# Patient Record
Sex: MALE | Race: OTHER | HISPANIC OR LATINO | URBAN - METROPOLITAN AREA
[De-identification: names, ages, dates, MRNs, and addresses within clinical notes are randomized per-mention and may not be internally consistent; named-entity substitution may affect disease eponyms.]

---

## 2018-01-05 ENCOUNTER — EMERGENCY (EMERGENCY)
Facility: HOSPITAL | Age: 53
LOS: 1 days | Discharge: HOME | End: 2018-01-05
Attending: EMERGENCY MEDICINE | Admitting: EMERGENCY MEDICINE
Payer: SELF-PAY

## 2018-01-05 VITALS
TEMPERATURE: 99 F | RESPIRATION RATE: 18 BRPM | HEART RATE: 75 BPM | OXYGEN SATURATION: 100 % | DIASTOLIC BLOOD PRESSURE: 79 MMHG | SYSTOLIC BLOOD PRESSURE: 130 MMHG

## 2018-01-05 LAB
ALBUMIN SERPL ELPH-MCNC: 4.3 G/DL — SIGNIFICANT CHANGE UP (ref 3.3–5)
ALP SERPL-CCNC: 52 U/L — SIGNIFICANT CHANGE UP (ref 40–120)
ALT FLD-CCNC: 38 U/L RC — SIGNIFICANT CHANGE UP (ref 10–45)
ANION GAP SERPL CALC-SCNC: 12 MMOL/L — SIGNIFICANT CHANGE UP (ref 5–17)
AST SERPL-CCNC: 25 U/L — SIGNIFICANT CHANGE UP (ref 10–40)
BASOPHILS # BLD AUTO: 0.1 K/UL — SIGNIFICANT CHANGE UP (ref 0–0.2)
BASOPHILS NFR BLD AUTO: 0.8 % — SIGNIFICANT CHANGE UP (ref 0–2)
BILIRUB SERPL-MCNC: 0.6 MG/DL — SIGNIFICANT CHANGE UP (ref 0.2–1.2)
BUN SERPL-MCNC: 12 MG/DL — SIGNIFICANT CHANGE UP (ref 7–23)
CALCIUM SERPL-MCNC: 8.8 MG/DL — SIGNIFICANT CHANGE UP (ref 8.4–10.5)
CHLORIDE SERPL-SCNC: 99 MMOL/L — SIGNIFICANT CHANGE UP (ref 96–108)
CO2 SERPL-SCNC: 26 MMOL/L — SIGNIFICANT CHANGE UP (ref 22–31)
CREAT SERPL-MCNC: 1.09 MG/DL — SIGNIFICANT CHANGE UP (ref 0.5–1.3)
EOSINOPHIL # BLD AUTO: 0.2 K/UL — SIGNIFICANT CHANGE UP (ref 0–0.5)
EOSINOPHIL NFR BLD AUTO: 2.3 % — SIGNIFICANT CHANGE UP (ref 0–6)
GLUCOSE SERPL-MCNC: 89 MG/DL — SIGNIFICANT CHANGE UP (ref 70–99)
HCT VFR BLD CALC: 43.3 % — SIGNIFICANT CHANGE UP (ref 39–50)
HGB BLD-MCNC: 15 G/DL — SIGNIFICANT CHANGE UP (ref 13–17)
LYMPHOCYTES # BLD AUTO: 2.6 K/UL — SIGNIFICANT CHANGE UP (ref 1–3.3)
LYMPHOCYTES # BLD AUTO: 37 % — SIGNIFICANT CHANGE UP (ref 13–44)
MCHC RBC-ENTMCNC: 29.6 PG — SIGNIFICANT CHANGE UP (ref 27–34)
MCHC RBC-ENTMCNC: 34.6 GM/DL — SIGNIFICANT CHANGE UP (ref 32–36)
MCV RBC AUTO: 85.5 FL — SIGNIFICANT CHANGE UP (ref 80–100)
MONOCYTES # BLD AUTO: 0.6 K/UL — SIGNIFICANT CHANGE UP (ref 0–0.9)
MONOCYTES NFR BLD AUTO: 9.2 % — SIGNIFICANT CHANGE UP (ref 2–14)
NEUTROPHILS # BLD AUTO: 3.5 K/UL — SIGNIFICANT CHANGE UP (ref 1.8–7.4)
NEUTROPHILS NFR BLD AUTO: 50.7 % — SIGNIFICANT CHANGE UP (ref 43–77)
PLATELET # BLD AUTO: 258 K/UL — SIGNIFICANT CHANGE UP (ref 150–400)
POTASSIUM SERPL-MCNC: 5.8 MMOL/L — HIGH (ref 3.5–5.3)
POTASSIUM SERPL-SCNC: 5.8 MMOL/L — HIGH (ref 3.5–5.3)
PROT SERPL-MCNC: 7.6 G/DL — SIGNIFICANT CHANGE UP (ref 6–8.3)
RBC # BLD: 5.06 M/UL — SIGNIFICANT CHANGE UP (ref 4.2–5.8)
RBC # FLD: 12.1 % — SIGNIFICANT CHANGE UP (ref 10.3–14.5)
SODIUM SERPL-SCNC: 137 MMOL/L — SIGNIFICANT CHANGE UP (ref 135–145)
WBC # BLD: 7 K/UL — SIGNIFICANT CHANGE UP (ref 3.8–10.5)
WBC # FLD AUTO: 7 K/UL — SIGNIFICANT CHANGE UP (ref 3.8–10.5)

## 2018-01-05 PROCEDURE — 70450 CT HEAD/BRAIN W/O DYE: CPT | Mod: 26

## 2018-01-05 PROCEDURE — 80053 COMPREHEN METABOLIC PANEL: CPT

## 2018-01-05 PROCEDURE — 99220: CPT | Mod: 25

## 2018-01-05 PROCEDURE — 93005 ELECTROCARDIOGRAM TRACING: CPT

## 2018-01-05 PROCEDURE — 96374 THER/PROPH/DIAG INJ IV PUSH: CPT | Mod: XU

## 2018-01-05 PROCEDURE — 96375 TX/PRO/DX INJ NEW DRUG ADDON: CPT

## 2018-01-05 PROCEDURE — 70553 MRI BRAIN STEM W/O & W/DYE: CPT

## 2018-01-05 PROCEDURE — G0378: CPT

## 2018-01-05 PROCEDURE — 85027 COMPLETE CBC AUTOMATED: CPT

## 2018-01-05 PROCEDURE — 99284 EMERGENCY DEPT VISIT MOD MDM: CPT | Mod: 25

## 2018-01-05 PROCEDURE — 93010 ELECTROCARDIOGRAM REPORT: CPT

## 2018-01-05 PROCEDURE — A9585: CPT

## 2018-01-05 PROCEDURE — 70450 CT HEAD/BRAIN W/O DYE: CPT

## 2018-01-05 PROCEDURE — 70553 MRI BRAIN STEM W/O & W/DYE: CPT | Mod: 26

## 2018-01-05 RX ORDER — ACETAMINOPHEN 500 MG
1000 TABLET ORAL ONCE
Qty: 0 | Refills: 0 | Status: COMPLETED | OUTPATIENT
Start: 2018-01-05 | End: 2018-01-05

## 2018-01-05 RX ORDER — DIPHENHYDRAMINE HCL 50 MG
25 CAPSULE ORAL ONCE
Qty: 0 | Refills: 0 | Status: COMPLETED | OUTPATIENT
Start: 2018-01-05 | End: 2018-01-05

## 2018-01-05 RX ORDER — METOCLOPRAMIDE HCL 10 MG
10 TABLET ORAL ONCE
Qty: 0 | Refills: 0 | Status: COMPLETED | OUTPATIENT
Start: 2018-01-05 | End: 2018-01-05

## 2018-01-05 RX ORDER — LOSARTAN POTASSIUM 100 MG/1
50 TABLET, FILM COATED ORAL DAILY
Qty: 0 | Refills: 0 | Status: DISCONTINUED | OUTPATIENT
Start: 2018-01-05 | End: 2018-01-09

## 2018-01-05 RX ORDER — SODIUM CHLORIDE 9 MG/ML
1000 INJECTION INTRAMUSCULAR; INTRAVENOUS; SUBCUTANEOUS ONCE
Qty: 0 | Refills: 0 | Status: COMPLETED | OUTPATIENT
Start: 2018-01-05 | End: 2018-01-05

## 2018-01-05 RX ORDER — SODIUM CHLORIDE 9 MG/ML
3 INJECTION INTRAMUSCULAR; INTRAVENOUS; SUBCUTANEOUS EVERY 8 HOURS
Qty: 0 | Refills: 0 | Status: DISCONTINUED | OUTPATIENT
Start: 2018-01-05 | End: 2018-01-09

## 2018-01-05 RX ADMIN — Medication 1000 MILLIGRAM(S): at 18:24

## 2018-01-05 RX ADMIN — SODIUM CHLORIDE 3 MILLILITER(S): 9 INJECTION INTRAMUSCULAR; INTRAVENOUS; SUBCUTANEOUS at 21:09

## 2018-01-05 RX ADMIN — Medication 25 MILLIGRAM(S): at 17:53

## 2018-01-05 RX ADMIN — Medication 10 MILLIGRAM(S): at 17:53

## 2018-01-05 RX ADMIN — SODIUM CHLORIDE 4000 MILLILITER(S): 9 INJECTION INTRAMUSCULAR; INTRAVENOUS; SUBCUTANEOUS at 17:53

## 2018-01-05 RX ADMIN — LOSARTAN POTASSIUM 50 MILLIGRAM(S): 100 TABLET, FILM COATED ORAL at 21:12

## 2018-01-05 RX ADMIN — Medication 400 MILLIGRAM(S): at 17:54

## 2018-01-05 NOTE — ED ADULT NURSE NOTE - CHPI ED SYMPTOMS NEG
no loss of consciousness/no nausea/no numbness/no change in level of consciousness/no fever/no vomiting/no blurred vision/no confusion

## 2018-01-05 NOTE — ED PROVIDER NOTE - OBJECTIVE STATEMENT
52M, Greenlandic-speaking, with PMHx of HTN. As per friend presents at bedside, pt complains of dizziness when he stands or sits up from lying position associated with R sided head pain, facial numbness, and chronic L hand/finger numbness. Symptoms onset x1 month ago, resolved temporarily and returned x5 days ago.

## 2018-01-05 NOTE — ED PROVIDER NOTE - PHYSICAL EXAMINATION
Attending MD Shah: A & O x 3, neck supple, NAD, EOMI b/l, PERRL b/l; lungs CTAB, heart with reg rhythm without murmur; abdomen soft NTND; extremities with no edema; affect appropriate.  Cranial nerves two through 12 grossly intact bilaterally. 5/5 strength in bilateral upper and lower extremities. Sensation intact grossly to light touch throughout,  Finger to nose and heel to shin normal bilaterally. Gait steady.

## 2018-01-05 NOTE — ED PROVIDER NOTE - MEDICAL DECISION MAKING DETAILS
Attending MD Shah: 52M with HTN presenting with acute on chronic headache and dizziness, nonfocal neuro exam, well appearing. Will obtain CT head to r/o ICH given new headache in middle aged man, screening labs, IV reglan/benadryl, fluids and reassessment.

## 2018-01-05 NOTE — ED CDU PROVIDER INITIAL DAY NOTE - ATTENDING CONTRIBUTION TO CARE
Attending MD Shah:   I personally have seen and examined this patient.  Physician assistant note reviewed and agree on plan of care and except where noted.  See HPI, PE, and MDM for details.

## 2018-01-05 NOTE — ED PROVIDER NOTE - PROGRESS NOTE DETAILS
Attending MD Shah: CT prelim read with question of hyperintense focus in left parietal region however neuroradiologist feels there was artifact and findings could be related to motion artifact. Will repeat CT head to minimize motion, if findings no longer present patient is OK for discharge. If findings persistent, will place in CDU for contrast MRI. Patient feels his headache is gone now and feels well. Patient signed out to Dr. Eden

## 2018-01-05 NOTE — ED ADULT NURSE NOTE - NS ED NURSE DC INFO COMPLEXITY
Patient asked questions/Simple: Patient demonstrates quick and easy understanding/Straightforward: Basic instructions, no meds, no home treatment

## 2018-01-05 NOTE — ED ADULT NURSE REASSESSMENT NOTE - COMFORT CARE
po fluids offered/repositioned/warm blanket provided/plan of care explained/ambulated to bathroom/darkened lights

## 2018-01-05 NOTE — ED CDU PROVIDER INITIAL DAY NOTE - OBJECTIVE STATEMENT
52M, Azeri-speaking, with PMHx of HTN. As per friend presents at bedside, pt complains of dizziness when he stands or sits up from lying position associated with R sided head pain, facial numbness, and chronic L hand/finger numbness. Symptoms onset x1 month ago, resolved temporarily and returned x5 days ago.    In the ED VSS.  CTH showing area of increased parietal attenuation.  Patient given tylenol, reglan and benadryl with improvement of symptoms.

## 2018-01-06 VITALS
TEMPERATURE: 98 F | SYSTOLIC BLOOD PRESSURE: 137 MMHG | OXYGEN SATURATION: 98 % | DIASTOLIC BLOOD PRESSURE: 87 MMHG | RESPIRATION RATE: 16 BRPM | HEART RATE: 67 BPM

## 2018-01-06 PROCEDURE — 99217: CPT

## 2018-01-06 RX ADMIN — SODIUM CHLORIDE 3 MILLILITER(S): 9 INJECTION INTRAMUSCULAR; INTRAVENOUS; SUBCUTANEOUS at 05:08

## 2018-01-06 NOTE — ED CDU PROVIDER DISPOSITION NOTE - ATTENDING CONTRIBUTION TO CARE
CDU Attending Note -- Pt seen and examined at bedside.  Case discussed c CDU PA.  Pt comfortable, asymptomatic, and has good follow up with neurology outpatient.  MRI discussed c pt, aware of migraine dx.  Neurologically intact presently.  Stable for d/c.  --BMM

## 2018-01-06 NOTE — ED ADULT NURSE REASSESSMENT NOTE - NS ED NURSE REASSESS COMMENT FT1
used to  MRI safety screening form: ID [id number]
Pt asleep on stretcher, no complaints, VSS, monitoring continues, Safety and comfort maintained.
Pt asleep on stretcher, no complaints, VSS, safety and comfort maintained, Monitoring continues.
Received report from DWIGHT Ponce Pt at CT scan at this time. Will assess.
Received report from DWIGHT Ponce. Pt lying on assigned stretcher shows no signs of any distress, no pain noted at this time. VS WNL. Awaiting proper disposition. Safety maintained & continue monitor.
Pt reports partial relief from IV tylenol
Received pt from DWIGHT Kraus, received pt alert and responsive, oriented x4. Pt denies any respiratory distress, SOB, difficulty breathing. Pt transferred to CDU for observation for L sided headache, pt currently states headache is 2/10 to L posterior region states "numb" sensation. No neuro deficits noted, pt denies blurred/ double vision, Pending MRI head in AM, pt aware. IV in place, patent and free of signs of infiltration,  pt denies chest pain or palpitations, V/S stable, pt afebrile. Pt educated on unit and unit rules, instructed patient to notify RN of any needed assistance, Pt verbalizes understanding, Call bell placed within reach. Safety maintained. Will continue to monitor. Family interpreted for pt as pt is Uzbek speaking.

## 2018-01-06 NOTE — ED CDU PROVIDER SUBSEQUENT DAY NOTE - HISTORY
No interval change from initial CDU provider note.  Patient seen at bedside in NAD.  VSS.  Patient resting comfortably without complaints. MRI showing T2 flair hyperintensity which can be seen in migraine headache.  negative for mass or acute intracranial pathology.

## 2018-01-06 NOTE — ED CDU PROVIDER SUBSEQUENT DAY NOTE - PROGRESS NOTE DETAILS
Patient seen at bedside in NAD.  VSS.  Patient resting comfortably.  Sleeping.  -Daren Ivy PA-C CDU PROGRESS NOTE LANETTE KAMARA: Pt resting comfortably, feeling well without complaint. NAD, VSS. Pt has had no complaints, pending Dr. Díaz evaluation patient is stable for d/c with neurology follow-up.

## 2018-01-06 NOTE — ED CDU PROVIDER DISPOSITION NOTE - PLAN OF CARE
1.  Stay Hydrated  2.  Take Tylenol 650mgs every 4-6 hrs as needed for headache.  Take Meclizine 25mgs every 8 hrs as needed for dizziness  3.  Follow up in medicine clinic upon discharge [phone]       Follow up in Neurology clinic upon discharge [phone]  4.  Return to the ER for worsening headache, blurry vision, weakness or any other concerning symptoms 1.  Stay Hydrated  2.  Take Tylenol 650mgs every 4-6 hrs as needed for headache.    3.  Follow up in medicine clinic upon discharge [phone]       Follow up in Neurology clinic upon discharge [phone]  4.  Return to the ER for worsening headache, blurry vision, weakness or any other concerning symptoms

## 2018-01-06 NOTE — ED CDU PROVIDER DISPOSITION NOTE - CLINICAL COURSE
52M, Belarusian-speaking, with PMHx of HTN. As per friend presents at bedside, pt complains of dizziness when he stands or sits up from lying position associated with R sided head pain, facial numbness, and chronic L hand/finger numbness. Symptoms onset x1 month ago, resolved temporarily and returned x5 days ago.   In the ED VSS.  CTH showing area of increased parietal attenuation.  Patient given tylenol, reglan and benadryl with improvement of symptoms.  Patient was placed in CDU.  MRI showing T2 flair hyperintensity consistent with migraine headache.  Negative for acute intracranial pathology or mass. 52M, Kiswahili-speaking, with PMHx of HTN. As per friend presents at bedside, pt complains of dizziness when he stands or sits up from lying position associated with R sided head pain, facial numbness, and chronic L hand/finger numbness. Symptoms onset x1 month ago, resolved temporarily and returned x5 days ago.   In the ED VSS.  CTH showing area of increased parietal attenuation.  Patient given tylenol, reglan and benadryl with improvement of symptoms.  Patient was placed in CDU.  MRI showing T2 flair hyperintensity consistent with migraine headache.  Negative for acute intracranial pathology or mass. Patient was without pain and headache resolved. Patient feeling improved and stable for d/c with neurology follow-up.

## 2022-05-06 NOTE — ED PROVIDER NOTE - CROS ED ROS STATEMENT
ATTENDING STATEMENT  I discussed the case with the Resident. I agree with the Resident's findings and plan, as documented in today's note         After Diarrhea 1 month ago-    Weight is up 330 gm.  Holger Nguyễn MD    Admission on 04/23/2022, Discharged on 04/23/2022   Component Date Value Ref Range Status    Sodium 04/23/2022 136  135 - 145 mmol/L Final    Potassium 04/23/2022 4.9  3.5 - 6.0 mmol/L Final    Chloride 04/23/2022 108 (A) 98 - 107 mmol/L Final    Carbon Dioxide 04/23/2022 19 (A) 21 - 32 mmol/L Final    Anion Gap 04/23/2022 14  10 - 20 mmol/L Final    Glucose 04/23/2022 77  70 - 99 mg/dL Final    BUN 04/23/2022 15  5 - 19 mg/dL Final    Creatinine 04/23/2022 0.34  0.16 - 0.59 mg/dL Final    Glomerular Filtration Rate 04/23/2022    Final    GFR not calculated for age less than 18 years    BUN/ Creatinine Ratio 04/23/2022 44 (A) 7 - 25 Final    Calcium 04/23/2022 9.6  8.0 - 11.0 mg/dL Final    Bilirubin, Total 04/23/2022 0.3  0.2 - 1.4 mg/dL Final    GOT/AST 04/23/2022 58  10 - 80 Units/L Final    GPT/ALT 04/23/2022 43  6 - 50 Units/L Final    Alkaline Phosphatase 04/23/2022 219  95 - 255 Units/L Final    Albumin 04/23/2022 3.8  3.5 - 4.8 g/dL Final    Protein, Total 04/23/2022 6.7  5.1 - 7.3 g/dL Final    Globulin 04/23/2022 2.9  2.0 - 4.0 g/dL Final    A/G Ratio 04/23/2022 1.3  1.0 - 2.4 Final    GLUCOSE, BEDSIDE - POINT OF CARE 04/23/2022 80  70 - 99 mg/dL Final      all other ROS negative except as per HPI

## 2024-01-10 NOTE — ED ADULT NURSE NOTE - OBJECTIVE STATEMENT
[place] YO male with PMH HTN via walk in presenting with 7/10 headache, associated with dizziness while standing, weakness. Pt reports pain started approximately one month ago suddenly, then stopped, and has now restarted five days ago. Nothing makes headache better or worse.  Pt Axox4, gross neuro intact, PERRL 4 mm. Lungs clear throughout bilateral. S1S2 heard. Abdomen soft, non-tender, non-distended. Skin warm, dry, and intact. Safety and comfort measures maintained. family present at bedside.